# Patient Record
Sex: FEMALE | Race: OTHER | HISPANIC OR LATINO
[De-identification: names, ages, dates, MRNs, and addresses within clinical notes are randomized per-mention and may not be internally consistent; named-entity substitution may affect disease eponyms.]

---

## 2017-03-30 ENCOUNTER — APPOINTMENT (OUTPATIENT)
Dept: OTOLARYNGOLOGY | Facility: CLINIC | Age: 32
End: 2017-03-30

## 2017-03-30 VITALS — OXYGEN SATURATION: 99 % | SYSTOLIC BLOOD PRESSURE: 112 MMHG | HEART RATE: 71 BPM | DIASTOLIC BLOOD PRESSURE: 79 MMHG

## 2017-03-30 VITALS — HEIGHT: 61 IN | WEIGHT: 138 LBS | BODY MASS INDEX: 26.06 KG/M2

## 2017-03-30 DIAGNOSIS — Z83.49 FAMILY HISTORY OF OTHER ENDOCRINE, NUTRITIONAL AND METABOLIC DISEASES: ICD-10-CM

## 2017-03-30 DIAGNOSIS — J04.0 ACUTE LARYNGITIS: ICD-10-CM

## 2017-03-30 DIAGNOSIS — Z83.3 FAMILY HISTORY OF DIABETES MELLITUS: ICD-10-CM

## 2017-03-30 DIAGNOSIS — Z78.9 OTHER SPECIFIED HEALTH STATUS: ICD-10-CM

## 2017-03-30 DIAGNOSIS — Z80.9 FAMILY HISTORY OF MALIGNANT NEOPLASM, UNSPECIFIED: ICD-10-CM

## 2017-03-30 DIAGNOSIS — E83.52 HYPERCALCEMIA: ICD-10-CM

## 2017-03-30 DIAGNOSIS — R68.89 OTHER GENERAL SYMPTOMS AND SIGNS: ICD-10-CM

## 2017-03-30 DIAGNOSIS — K21.9 ACUTE LARYNGITIS: ICD-10-CM

## 2017-03-30 DIAGNOSIS — D11.9 BENIGN NEOPLASM OF MAJOR SALIVARY GLAND, UNSPECIFIED: ICD-10-CM

## 2017-03-30 DIAGNOSIS — R22.1 LOCALIZED SWELLING, MASS AND LUMP, NECK: ICD-10-CM

## 2017-03-30 DIAGNOSIS — E06.3 AUTOIMMUNE THYROIDITIS: ICD-10-CM

## 2017-03-30 DIAGNOSIS — Z86.39 PERSONAL HISTORY OF OTHER ENDOCRINE, NUTRITIONAL AND METABOLIC DISEASE: ICD-10-CM

## 2017-03-30 DIAGNOSIS — Z82.49 FAMILY HISTORY OF ISCHEMIC HEART DISEASE AND OTHER DISEASES OF THE CIRCULATORY SYSTEM: ICD-10-CM

## 2017-03-30 RX ORDER — UBIDECARENONE/VIT E ACET 100MG-5
1000 CAPSULE ORAL
Refills: 0 | Status: ACTIVE | COMMUNITY

## 2017-03-31 ENCOUNTER — TRANSCRIPTION ENCOUNTER (OUTPATIENT)
Age: 32
End: 2017-03-31

## 2017-04-11 ENCOUNTER — RESULT REVIEW (OUTPATIENT)
Age: 32
End: 2017-04-11

## 2017-04-11 VITALS
WEIGHT: 132.94 LBS | SYSTOLIC BLOOD PRESSURE: 102 MMHG | HEART RATE: 79 BPM | DIASTOLIC BLOOD PRESSURE: 62 MMHG | RESPIRATION RATE: 15 BRPM | TEMPERATURE: 98 F | OXYGEN SATURATION: 99 % | HEIGHT: 61 IN

## 2017-04-11 NOTE — ASU PATIENT PROFILE, ADULT - PMH
Facial paralysis/[place] palsy    Hyperparathyroidism    Severe preeclampsia  2002  Vitamin D deficiency

## 2017-04-11 NOTE — ASU PATIENT PROFILE, ADULT - TEACHING/LEARNING LEARNING PREFERENCES
verbal instruction/written material verbal instruction/written material/individual instruction/audio

## 2017-04-11 NOTE — ASU PREOP CHECKLIST - SELECT TESTS ORDERED
CBC/EKG/Urinalysis/PT/PTT/CXR/u/a c&s/HCG/CMP u/a c&s, ICON_NEGATIVE/PT/PTT/CXR/CMP/Urinalysis/HCG/CBC/EKG

## 2017-04-12 ENCOUNTER — APPOINTMENT (OUTPATIENT)
Dept: OTOLARYNGOLOGY | Facility: HOSPITAL | Age: 32
End: 2017-04-12

## 2017-04-12 ENCOUNTER — OUTPATIENT (OUTPATIENT)
Dept: OUTPATIENT SERVICES | Facility: HOSPITAL | Age: 32
LOS: 1 days | Discharge: HOME | End: 2017-04-12
Payer: COMMERCIAL

## 2017-04-12 VITALS
RESPIRATION RATE: 20 BRPM | HEART RATE: 90 BPM | OXYGEN SATURATION: 99 % | DIASTOLIC BLOOD PRESSURE: 64 MMHG | SYSTOLIC BLOOD PRESSURE: 109 MMHG

## 2017-04-12 DIAGNOSIS — Z98.891 HISTORY OF UTERINE SCAR FROM PREVIOUS SURGERY: Chronic | ICD-10-CM

## 2017-04-12 LAB
ALBUMIN SERPL ELPH-MCNC: 3.7 G/DL — SIGNIFICANT CHANGE UP (ref 3.4–5)
ANION GAP SERPL CALC-SCNC: 7 MMOL/L — LOW (ref 9–16)
BUN SERPL-MCNC: 14 MG/DL — SIGNIFICANT CHANGE UP (ref 7–23)
CALCIUM SERPL-MCNC: 9 MG/DL — SIGNIFICANT CHANGE UP (ref 8.5–10.5)
CHLORIDE SERPL-SCNC: 107 MMOL/L — SIGNIFICANT CHANGE UP (ref 96–108)
CO2 SERPL-SCNC: 24 MMOL/L — SIGNIFICANT CHANGE UP (ref 22–31)
CREAT SERPL-MCNC: 0.51 MG/DL — SIGNIFICANT CHANGE UP (ref 0.5–1.3)
GLUCOSE SERPL-MCNC: 136 MG/DL — HIGH (ref 70–99)
POTASSIUM SERPL-MCNC: 4.1 MMOL/L — SIGNIFICANT CHANGE UP (ref 3.5–5.3)
POTASSIUM SERPL-SCNC: 4.1 MMOL/L — SIGNIFICANT CHANGE UP (ref 3.5–5.3)
PTH-INTACT IO % DIF SERPL: 17.7 PG/ML — SIGNIFICANT CHANGE UP (ref 8.5–72.5)
PTH-INTACT IO % DIF SERPL: 21.3 PG/ML — SIGNIFICANT CHANGE UP (ref 8.5–72.5)
PTH-INTACT IO % DIF SERPL: 7.5 PG/ML — LOW (ref 8.5–72.5)
PTH-INTACT IO % DIF SERPL: 96.4 PG/ML — HIGH (ref 8.5–72.5)
SODIUM SERPL-SCNC: 138 MMOL/L — SIGNIFICANT CHANGE UP (ref 135–145)

## 2017-04-12 PROCEDURE — 88305 TISSUE EXAM BY PATHOLOGIST: CPT

## 2017-04-12 PROCEDURE — 42440 EXCISE SUBMAXILLARY GLAND: CPT | Mod: LT

## 2017-04-12 PROCEDURE — 60500 EXPLORE PARATHYROID GLANDS: CPT

## 2017-04-12 PROCEDURE — 82040 ASSAY OF SERUM ALBUMIN: CPT

## 2017-04-12 PROCEDURE — 83970 ASSAY OF PARATHORMONE: CPT

## 2017-04-12 PROCEDURE — 38510 BIOPSY/REMOVAL LYMPH NODES: CPT

## 2017-04-12 PROCEDURE — 60520 REMOVAL OF THYMUS GLAND: CPT | Mod: 59

## 2017-04-12 PROCEDURE — 88331 PATH CONSLTJ SURG 1 BLK 1SPC: CPT

## 2017-04-12 PROCEDURE — 36415 COLL VENOUS BLD VENIPUNCTURE: CPT

## 2017-04-12 PROCEDURE — 88309 TISSUE EXAM BY PATHOLOGIST: CPT

## 2017-04-12 PROCEDURE — 60500 EXPLORE PARATHYROID GLANDS: CPT | Mod: RT

## 2017-04-12 PROCEDURE — 86901 BLOOD TYPING SEROLOGIC RH(D): CPT

## 2017-04-12 PROCEDURE — 86850 RBC ANTIBODY SCREEN: CPT

## 2017-04-12 PROCEDURE — 88311 DECALCIFY TISSUE: CPT

## 2017-04-12 PROCEDURE — 80048 BASIC METABOLIC PNL TOTAL CA: CPT

## 2017-04-12 PROCEDURE — 86900 BLOOD TYPING SEROLOGIC ABO: CPT

## 2017-04-12 RX ORDER — ONDANSETRON 8 MG/1
4 TABLET, FILM COATED ORAL EVERY 6 HOURS
Qty: 0 | Refills: 0 | Status: DISCONTINUED | OUTPATIENT
Start: 2017-04-12 | End: 2017-04-12

## 2017-04-12 RX ORDER — KETOROLAC TROMETHAMINE 30 MG/ML
30 SYRINGE (ML) INJECTION ONCE
Qty: 0 | Refills: 0 | Status: DISCONTINUED | OUTPATIENT
Start: 2017-04-12 | End: 2017-04-12

## 2017-04-12 RX ORDER — TRAMADOL HYDROCHLORIDE 50 MG/1
50 TABLET ORAL EVERY 4 HOURS
Qty: 0 | Refills: 0 | Status: DISCONTINUED | OUTPATIENT
Start: 2017-04-12 | End: 2017-04-12

## 2017-04-12 RX ORDER — SODIUM CHLORIDE 9 MG/ML
1000 INJECTION, SOLUTION INTRAVENOUS
Qty: 0 | Refills: 0 | Status: DISCONTINUED | OUTPATIENT
Start: 2017-04-12 | End: 2017-04-12

## 2017-04-12 RX ADMIN — TRAMADOL HYDROCHLORIDE 50 MILLIGRAM(S): 50 TABLET ORAL at 18:20

## 2017-04-12 RX ADMIN — Medication 30 MILLIGRAM(S): at 14:00

## 2017-04-12 RX ADMIN — Medication 30 MILLIGRAM(S): at 13:23

## 2017-04-12 RX ADMIN — ONDANSETRON 4 MILLIGRAM(S): 8 TABLET, FILM COATED ORAL at 14:41

## 2017-04-12 RX ADMIN — TRAMADOL HYDROCHLORIDE 50 MILLIGRAM(S): 50 TABLET ORAL at 18:50

## 2017-04-12 NOTE — BRIEF OPERATIVE NOTE - PROCEDURE
Submandibular gland removal  04/12/2017    Active  [email or web address]  Parathyroidectomy with nerve integrity monitoring  04/12/2017    Active  [email or web address]

## 2017-04-12 NOTE — BRIEF OPERATIVE NOTE - PRE-OP DX
Hyperparathyroidism  04/12/2017    Active  Ruthann Abbott  Submandibular gland mass  04/12/2017    Active  Ruthann Abbott

## 2017-04-13 LAB — SURGICAL PATHOLOGY STUDY: SIGNIFICANT CHANGE UP

## 2017-04-17 ENCOUNTER — LABORATORY RESULT (OUTPATIENT)
Age: 32
End: 2017-04-17

## 2017-04-17 ENCOUNTER — APPOINTMENT (OUTPATIENT)
Dept: OTOLARYNGOLOGY | Facility: CLINIC | Age: 32
End: 2017-04-17

## 2017-04-17 VITALS
HEART RATE: 60 BPM | DIASTOLIC BLOOD PRESSURE: 82 MMHG | SYSTOLIC BLOOD PRESSURE: 125 MMHG | TEMPERATURE: 98.5 F | OXYGEN SATURATION: 100 %

## 2017-04-17 DIAGNOSIS — D11.7 BENIGN NEOPLASM OF OTHER MAJOR SALIVARY GLANDS: ICD-10-CM

## 2017-04-17 DIAGNOSIS — E06.3 AUTOIMMUNE THYROIDITIS: ICD-10-CM

## 2017-04-17 DIAGNOSIS — E21.0 PRIMARY HYPERPARATHYROIDISM: ICD-10-CM

## 2017-04-17 DIAGNOSIS — Z88.5 ALLERGY STATUS TO NARCOTIC AGENT: ICD-10-CM

## 2017-04-17 DIAGNOSIS — Z91.040 LATEX ALLERGY STATUS: ICD-10-CM

## 2017-04-17 DIAGNOSIS — D35.1 BENIGN NEOPLASM OF PARATHYROID GLAND: ICD-10-CM

## 2017-04-17 DIAGNOSIS — R59.0 LOCALIZED ENLARGED LYMPH NODES: ICD-10-CM

## 2017-04-17 PROBLEM — E55.9 VITAMIN D DEFICIENCY, UNSPECIFIED: Chronic | Status: ACTIVE | Noted: 2017-04-11

## 2017-04-17 PROBLEM — G51.0 BELL'S PALSY: Chronic | Status: ACTIVE | Noted: 2017-04-11

## 2017-04-17 PROBLEM — E21.3 HYPERPARATHYROIDISM, UNSPECIFIED: Chronic | Status: ACTIVE | Noted: 2017-04-11

## 2017-04-17 PROBLEM — O14.10 SEVERE PRE-ECLAMPSIA, UNSPECIFIED TRIMESTER: Chronic | Status: ACTIVE | Noted: 2017-04-11

## 2017-05-04 ENCOUNTER — APPOINTMENT (OUTPATIENT)
Dept: OTOLARYNGOLOGY | Facility: CLINIC | Age: 32
End: 2017-05-04

## 2017-05-04 VITALS — SYSTOLIC BLOOD PRESSURE: 131 MMHG | OXYGEN SATURATION: 99 % | DIASTOLIC BLOOD PRESSURE: 73 MMHG | HEART RATE: 83 BPM

## 2017-05-04 DIAGNOSIS — D11.9 BENIGN NEOPLASM OF MAJOR SALIVARY GLAND, UNSPECIFIED: ICD-10-CM

## 2017-05-04 DIAGNOSIS — D35.1 BENIGN NEOPLASM OF PARATHYROID GLAND: ICD-10-CM

## 2017-05-04 DIAGNOSIS — R04.0 EPISTAXIS: ICD-10-CM

## 2017-05-04 RX ORDER — AZITHROMYCIN DIHYDRATE 500 MG/1
500 TABLET, FILM COATED ORAL DAILY
Qty: 1 | Refills: 0 | Status: COMPLETED | COMMUNITY
Start: 2017-04-11 | End: 2017-05-04

## 2017-05-30 ENCOUNTER — RESULT REVIEW (OUTPATIENT)
Age: 32
End: 2017-05-30

## 2019-03-25 ENCOUNTER — TRANSCRIPTION ENCOUNTER (OUTPATIENT)
Age: 34
End: 2019-03-25

## 2019-03-25 ENCOUNTER — APPOINTMENT (OUTPATIENT)
Dept: INTERNAL MEDICINE | Facility: CLINIC | Age: 34
End: 2019-03-25
Payer: COMMERCIAL

## 2019-03-25 ENCOUNTER — LABORATORY RESULT (OUTPATIENT)
Age: 34
End: 2019-03-25

## 2019-03-25 VITALS
BODY MASS INDEX: 25.03 KG/M2 | SYSTOLIC BLOOD PRESSURE: 124 MMHG | DIASTOLIC BLOOD PRESSURE: 70 MMHG | HEIGHT: 62 IN | WEIGHT: 136 LBS

## 2019-03-25 LAB
HCG UR QL: NEGATIVE
QUALITY CONTROL: YES

## 2019-03-25 PROCEDURE — 93000 ELECTROCARDIOGRAM COMPLETE: CPT

## 2019-03-25 PROCEDURE — 86580 TB INTRADERMAL TEST: CPT

## 2019-03-25 PROCEDURE — 81025 URINE PREGNANCY TEST: CPT

## 2019-03-25 PROCEDURE — 36415 COLL VENOUS BLD VENIPUNCTURE: CPT

## 2019-03-25 PROCEDURE — 99395 PREV VISIT EST AGE 18-39: CPT | Mod: 25

## 2019-03-25 RX ORDER — FLUTICASONE PROPIONATE 50 MCG
50 SPRAY, SUSPENSION NASAL
Refills: 0 | Status: DISCONTINUED | COMMUNITY
End: 2019-03-25

## 2019-03-26 ENCOUNTER — NON-APPOINTMENT (OUTPATIENT)
Age: 34
End: 2019-03-26

## 2019-03-26 LAB
ALBUMIN SERPL ELPH-MCNC: 4.3 G/DL
ALP BLD-CCNC: 76 U/L
ALT SERPL-CCNC: 21 U/L
ANION GAP SERPL CALC-SCNC: 13 MMOL/L
APPEARANCE: ABNORMAL
AST SERPL-CCNC: 20 U/L
BASOPHILS # BLD AUTO: 0.04 K/UL
BASOPHILS NFR BLD AUTO: 0.9 %
BILIRUB SERPL-MCNC: 0.2 MG/DL
BILIRUBIN URINE: NEGATIVE
BLOOD URINE: NEGATIVE
BUN SERPL-MCNC: 10 MG/DL
CALCIUM SERPL-MCNC: 9.2 MG/DL
CHLORIDE SERPL-SCNC: 103 MMOL/L
CHOLEST SERPL-MCNC: 171 MG/DL
CHOLEST/HDLC SERPL: 3.2 RATIO
CO2 SERPL-SCNC: 22 MMOL/L
COLOR: YELLOW
CREAT SERPL-MCNC: 0.67 MG/DL
EOSINOPHIL # BLD AUTO: 0.09 K/UL
EOSINOPHIL NFR BLD AUTO: 2.1 %
FOLATE SERPL-MCNC: 13.5 NG/ML
GLUCOSE QUALITATIVE U: NEGATIVE
GLUCOSE SERPL-MCNC: 101 MG/DL
HCG SERPL QL: NEGATIVE
HCT VFR BLD CALC: 42.3 %
HDLC SERPL-MCNC: 53 MG/DL
HGB BLD-MCNC: 13.7 G/DL
IMM GRANULOCYTES NFR BLD AUTO: 0.7 %
KETONES URINE: NEGATIVE
LDLC SERPL CALC-MCNC: 102 MG/DL
LEUKOCYTE ESTERASE URINE: NEGATIVE
LYMPHOCYTES # BLD AUTO: 0.99 K/UL
LYMPHOCYTES NFR BLD AUTO: 23.5 %
MAN DIFF?: NORMAL
MCHC RBC-ENTMCNC: 28.4 PG
MCHC RBC-ENTMCNC: 32.4 GM/DL
MCV RBC AUTO: 87.8 FL
MONOCYTES # BLD AUTO: 0.34 K/UL
MONOCYTES NFR BLD AUTO: 8.1 %
NEUTROPHILS # BLD AUTO: 2.73 K/UL
NEUTROPHILS NFR BLD AUTO: 64.7 %
NITRITE URINE: NEGATIVE
PAPP-A SERPL-ACNC: <1 MIU/ML
PH URINE: 5.5
PLATELET # BLD AUTO: 177 K/UL
POTASSIUM SERPL-SCNC: 4 MMOL/L
PROT SERPL-MCNC: 7.1 G/DL
PROTEIN URINE: NEGATIVE
RBC # BLD: 4.82 M/UL
RBC # FLD: 13.2 %
SODIUM SERPL-SCNC: 138 MMOL/L
SPECIFIC GRAVITY URINE: 1.03
T4 FREE SERPL-MCNC: 1 NG/DL
TRIGL SERPL-MCNC: 82 MG/DL
TSH SERPL-ACNC: 2.28 UIU/ML
UROBILINOGEN URINE: NORMAL
VIT B12 SERPL-MCNC: 664 PG/ML
WBC # FLD AUTO: 4.22 K/UL

## 2019-03-26 NOTE — COUNSELING
[Activity counseling provided] : activity [Behavioral health counseling provided] : behavioral health  [Engage in a relaxing activity] : Engage in a relaxing activity

## 2019-03-26 NOTE — PLAN
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 Only concern is very small possibility of pregnancy, in office test neg, b/w sent. Will f/u in office in 2 days for PPD reading, lab results and school forms

## 2019-03-26 NOTE — HEALTH RISK ASSESSMENT
[Very Good] : ~his/her~  mood as very good [No falls in past year] : Patient reported no falls in the past year [0] : 2) Feeling down, depressed, or hopeless: Not at all (0) [Patient reported PAP Smear was normal] : Patient reported PAP Smear was normal [HIV test declined] : HIV test declined [Hepatitis C test declined] : Hepatitis C test declined [With Family] : lives with family [Feels Safe at Home] : Feels safe at home [] : No [de-identified] : socially [de-identified] : mostly healthy [MammogramDate] : N/A [PapSmearDate] : 03/2018 [PapSmearComments] : DR. BROTHERS [BoneDensityDate] : N/A [ColonoscopyDate] : N/A

## 2019-03-26 NOTE — HISTORY OF PRESENT ILLNESS
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

## 2019-03-26 NOTE — PHYSICAL EXAM
[Normal Female:] : bladder was normal on palpation [Normal] : normal gait, coordination grossly intact, no focal deficits [de-identified] : denies lumps pain discharge [FreeTextEntry1] : GYN [de-identified] : no lymphadenopathy [de-identified] : denies excessive thirst, urination [de-identified] : acne, no other rash/lesions [de-identified] : A & O X3, appropriate mood & affect

## 2019-03-26 NOTE — REVIEW OF SYSTEMS
[Negative] : Heme/Lymph [FreeTextEntry1] : period a few days late, concerned about pregnancy, uses protection, has appt scheduled with GYN to discuss BC

## 2019-03-27 LAB — 25(OH)D3 SERPL-MCNC: 35.7 NG/ML

## 2019-04-01 ENCOUNTER — APPOINTMENT (OUTPATIENT)
Dept: INTERNAL MEDICINE | Facility: CLINIC | Age: 34
End: 2019-04-01
Payer: COMMERCIAL

## 2019-04-01 DIAGNOSIS — Z11.1 ENCOUNTER FOR SCREENING FOR RESPIRATORY TUBERCULOSIS: ICD-10-CM

## 2019-04-01 DIAGNOSIS — Z23 ENCOUNTER FOR IMMUNIZATION: ICD-10-CM

## 2019-04-01 PROCEDURE — G0008: CPT

## 2019-04-01 PROCEDURE — 90686 IIV4 VACC NO PRSV 0.5 ML IM: CPT

## 2019-04-17 ENCOUNTER — APPOINTMENT (OUTPATIENT)
Dept: INTERNAL MEDICINE | Facility: CLINIC | Age: 34
End: 2019-04-17

## 2019-05-14 ENCOUNTER — APPOINTMENT (OUTPATIENT)
Dept: OBGYN | Facility: CLINIC | Age: 34
End: 2019-05-14

## 2019-09-10 ENCOUNTER — APPOINTMENT (OUTPATIENT)
Dept: OBGYN | Facility: CLINIC | Age: 34
End: 2019-09-10

## 2020-08-28 ENCOUNTER — APPOINTMENT (OUTPATIENT)
Dept: ENDOCRINOLOGY | Facility: CLINIC | Age: 35
End: 2020-08-28
Payer: COMMERCIAL

## 2020-08-28 VITALS
SYSTOLIC BLOOD PRESSURE: 116 MMHG | WEIGHT: 136 LBS | DIASTOLIC BLOOD PRESSURE: 68 MMHG | HEIGHT: 62 IN | OXYGEN SATURATION: 99 % | HEART RATE: 71 BPM | BODY MASS INDEX: 25.03 KG/M2

## 2020-08-28 DIAGNOSIS — E06.3 AUTOIMMUNE THYROIDITIS: ICD-10-CM

## 2020-08-28 LAB
24R-OH-CALCIDIOL SERPL-MCNC: 54.7 PG/ML
25(OH)D3 SERPL-MCNC: 30 NG/ML
ALBUMIN SERPL ELPH-MCNC: 4.6 G/DL
ALP BLD-CCNC: 72 U/L
ALT SERPL-CCNC: 17 U/L
ANION GAP SERPL CALC-SCNC: 12 MMOL/L
AST SERPL-CCNC: 18 U/L
BILIRUB SERPL-MCNC: <0.2 MG/DL
BUN SERPL-MCNC: 10 MG/DL
CALCIUM SERPL-MCNC: 9.6 MG/DL
CALCIUM SERPL-MCNC: 9.6 MG/DL
CHLORIDE SERPL-SCNC: 103 MMOL/L
CO2 SERPL-SCNC: 23 MMOL/L
CREAT SERPL-MCNC: 0.67 MG/DL
GLUCOSE SERPL-MCNC: 100 MG/DL
PARATHYROID HORMONE INTACT: 24 PG/ML
POTASSIUM SERPL-SCNC: 4.1 MMOL/L
PROT SERPL-MCNC: 6.9 G/DL
SODIUM SERPL-SCNC: 138 MMOL/L
T4 FREE SERPL-MCNC: 1.1 NG/DL
TSH SERPL-ACNC: 1.71 UIU/ML

## 2020-08-28 PROCEDURE — 99214 OFFICE O/P EST MOD 30 MIN: CPT

## 2020-09-03 NOTE — HISTORY OF PRESENT ILLNESS
[FreeTextEntry1] : Ms. DILLAN DANIEL is a 35 year old female\par coming for f/u primary hyperparathyroidism s/p parathyroid surgery 2017 with Dr Morillo\par she came back today as her mood swings and fatigue came back and she felt the same at the time her hyperparathyroidism was going on

## 2020-10-05 ENCOUNTER — APPOINTMENT (OUTPATIENT)
Dept: INTERNAL MEDICINE | Facility: CLINIC | Age: 35
End: 2020-10-05

## 2020-10-28 ENCOUNTER — APPOINTMENT (OUTPATIENT)
Dept: INTERNAL MEDICINE | Facility: CLINIC | Age: 35
End: 2020-10-28
Payer: COMMERCIAL

## 2020-10-28 VITALS
BODY MASS INDEX: 24.66 KG/M2 | WEIGHT: 134 LBS | DIASTOLIC BLOOD PRESSURE: 60 MMHG | SYSTOLIC BLOOD PRESSURE: 116 MMHG | HEIGHT: 62 IN

## 2020-10-28 PROCEDURE — 99395 PREV VISIT EST AGE 18-39: CPT | Mod: 25

## 2020-10-28 PROCEDURE — 99072 ADDL SUPL MATRL&STAF TM PHE: CPT

## 2020-10-28 PROCEDURE — 36415 COLL VENOUS BLD VENIPUNCTURE: CPT

## 2020-10-28 NOTE — PLAN
[FreeTextEntry1] : 35-year-old female is noted presents for annual exam, feels well, denies chest pain shortness of breath palpitations dizziness.\par Stressed the importance of regular aerobic exercise\par Up to date with screenings, has received flu vaccine \par Call next week to review labs\par \par

## 2020-10-28 NOTE — HEALTH RISK ASSESSMENT
[Very Good] : ~his/her~  mood as very good [Yes] : Yes [Monthly or less (1 pt)] : Monthly or less (1 point) [1 or 2 (0 pts)] : 1 or 2 (0 points) [Never (0 pts)] : Never (0 points) [No falls in past year] : Patient reported no falls in the past year [0] : 2) Feeling down, depressed, or hopeless: Not at all (0) [Patient declined mammogram] : Patient declined mammogram [Patient reported PAP Smear was normal] : Patient reported PAP Smear was normal [Patient declined bone density test] : Patient declined bone density test [Patient declined colonoscopy] : Patient declined colonoscopy [HIV test declined] : HIV test declined [Hepatitis C test declined] : Hepatitis C test declined [] : No [[id number]] : 0 [PapSmearDate] : 02/19

## 2020-10-28 NOTE — HISTORY OF PRESENT ILLNESS
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 Feels great, very dizzy but not exercising regularly, encouraged to do so. Denies chest pain shortness of breath palpitations dizziness\par \par Up-to-date with endocrinology, no problems since having parathyroidectomy

## 2020-10-28 NOTE — PHYSICAL EXAM
[Normal Female:] : bladder was normal on palpation [Normal] : normal gait, coordination grossly intact, no focal deficits [de-identified] : Deferred GYN; Denies pain, lumps and discharge [FreeTextEntry1] : Deferred GI/GYN [de-identified] : No lymphadenopathy [de-identified] : Denies excessive thirst, urination, fatigue [de-identified] : No rash or skin lesion [de-identified] : Alert and Oriented x3.  Appropriate mood and affect

## 2020-10-29 LAB
ALBUMIN SERPL ELPH-MCNC: 4.5 G/DL
ALP BLD-CCNC: 82 U/L
ALT SERPL-CCNC: 16 U/L
ANION GAP SERPL CALC-SCNC: 11 MMOL/L
APPEARANCE: CLEAR
AST SERPL-CCNC: 16 U/L
BASOPHILS # BLD AUTO: 0.06 K/UL
BASOPHILS NFR BLD AUTO: 1.3 %
BILIRUB SERPL-MCNC: <0.2 MG/DL
BILIRUBIN URINE: NEGATIVE
BLOOD URINE: NEGATIVE
BUN SERPL-MCNC: 7 MG/DL
CALCIUM SERPL-MCNC: 9.3 MG/DL
CHLORIDE SERPL-SCNC: 102 MMOL/L
CHOLEST SERPL-MCNC: 189 MG/DL
CO2 SERPL-SCNC: 26 MMOL/L
COLOR: NORMAL
CREAT SERPL-MCNC: 0.66 MG/DL
EOSINOPHIL # BLD AUTO: 0.11 K/UL
EOSINOPHIL NFR BLD AUTO: 2.4 %
FOLATE SERPL-MCNC: 16 NG/ML
GLUCOSE QUALITATIVE U: NEGATIVE
GLUCOSE SERPL-MCNC: 83 MG/DL
HCT VFR BLD CALC: 41.7 %
HDLC SERPL-MCNC: 54 MG/DL
HGB BLD-MCNC: 13.3 G/DL
IMM GRANULOCYTES NFR BLD AUTO: 0.4 %
KETONES URINE: NEGATIVE
LDLC SERPL CALC-MCNC: 110 MG/DL
LEUKOCYTE ESTERASE URINE: NEGATIVE
LYMPHOCYTES # BLD AUTO: 1.48 K/UL
LYMPHOCYTES NFR BLD AUTO: 32.4 %
MAN DIFF?: NORMAL
MCHC RBC-ENTMCNC: 28.2 PG
MCHC RBC-ENTMCNC: 31.9 GM/DL
MCV RBC AUTO: 88.3 FL
MONOCYTES # BLD AUTO: 0.33 K/UL
MONOCYTES NFR BLD AUTO: 7.2 %
NEUTROPHILS # BLD AUTO: 2.57 K/UL
NEUTROPHILS NFR BLD AUTO: 56.3 %
NITRITE URINE: NEGATIVE
NONHDLC SERPL-MCNC: 135 MG/DL
PH URINE: 6
PLATELET # BLD AUTO: 166 K/UL
POTASSIUM SERPL-SCNC: 4.3 MMOL/L
PROT SERPL-MCNC: 6.8 G/DL
PROTEIN URINE: NEGATIVE
RBC # BLD: 4.72 M/UL
RBC # FLD: 12.4 %
SODIUM SERPL-SCNC: 138 MMOL/L
SPECIFIC GRAVITY URINE: 1.01
T4 FREE SERPL-MCNC: 1.1 NG/DL
TRIGL SERPL-MCNC: 122 MG/DL
TSH SERPL-ACNC: 2.16 UIU/ML
UROBILINOGEN URINE: NORMAL
VIT B12 SERPL-MCNC: 1080 PG/ML
WBC # FLD AUTO: 4.57 K/UL

## 2020-11-10 ENCOUNTER — APPOINTMENT (OUTPATIENT)
Dept: OBGYN | Facility: CLINIC | Age: 35
End: 2020-11-10
Payer: COMMERCIAL

## 2020-11-10 VITALS
SYSTOLIC BLOOD PRESSURE: 120 MMHG | TEMPERATURE: 97.5 F | BODY MASS INDEX: 25.4 KG/M2 | WEIGHT: 138 LBS | DIASTOLIC BLOOD PRESSURE: 70 MMHG | HEIGHT: 62 IN

## 2020-11-10 PROCEDURE — 99072 ADDL SUPL MATRL&STAF TM PHE: CPT

## 2020-11-10 PROCEDURE — 99395 PREV VISIT EST AGE 18-39: CPT

## 2020-11-11 NOTE — HISTORY OF PRESENT ILLNESS
[TextBox_4] : 36yo P1 here for annual exam/ last was 3.5years ago. Reg periods.  No complaints.  H/o parathyroidectomy.\par        Not on any contraception currrently/ withdrawal.  \par        ; one child- 17yrs old.  She is in a relationship.  Currently studying to complete her RN/ will be done 5/2021.  Currently working in an urgent care.

## 2020-11-13 LAB
CYTOLOGY CVX/VAG DOC THIN PREP: NORMAL
HPV HIGH+LOW RISK DNA PNL CVX: NOT DETECTED

## 2020-12-21 PROBLEM — Z11.1 ENCOUNTER FOR PPD TEST: Status: RESOLVED | Noted: 2019-03-25 | Resolved: 2020-12-21

## 2021-11-01 ENCOUNTER — APPOINTMENT (OUTPATIENT)
Dept: RHEUMATOLOGY | Facility: CLINIC | Age: 36
End: 2021-11-01
Payer: MEDICAID

## 2021-11-01 VITALS
DIASTOLIC BLOOD PRESSURE: 87 MMHG | HEART RATE: 77 BPM | SYSTOLIC BLOOD PRESSURE: 126 MMHG | HEIGHT: 62 IN | WEIGHT: 146 LBS | BODY MASS INDEX: 26.87 KG/M2 | OXYGEN SATURATION: 99 %

## 2021-11-01 DIAGNOSIS — M35.00 SICCA SYNDROME, UNSPECIFIED: ICD-10-CM

## 2021-11-01 DIAGNOSIS — G89.29 PAIN IN UNSPECIFIED JOINT: ICD-10-CM

## 2021-11-01 DIAGNOSIS — M25.50 PAIN IN UNSPECIFIED JOINT: ICD-10-CM

## 2021-11-01 PROCEDURE — 99203 OFFICE O/P NEW LOW 30 MIN: CPT | Mod: 25

## 2021-11-01 PROCEDURE — 99072 ADDL SUPL MATRL&STAF TM PHE: CPT

## 2021-11-02 LAB
BASOPHILS # BLD AUTO: 0.07 K/UL
BASOPHILS NFR BLD AUTO: 1.5 %
EOSINOPHIL # BLD AUTO: 0.09 K/UL
EOSINOPHIL NFR BLD AUTO: 1.9 %
ERYTHROCYTE [SEDIMENTATION RATE] IN BLOOD BY WESTERGREN METHOD: 13 MM/HR
HCT VFR BLD CALC: 39.6 %
HGB BLD-MCNC: 13.2 G/DL
IMM GRANULOCYTES NFR BLD AUTO: 0.4 %
LYMPHOCYTES # BLD AUTO: 1.52 K/UL
LYMPHOCYTES NFR BLD AUTO: 32 %
MAN DIFF?: NORMAL
MCHC RBC-ENTMCNC: 29.1 PG
MCHC RBC-ENTMCNC: 33.3 GM/DL
MCV RBC AUTO: 87.4 FL
MONOCYTES # BLD AUTO: 0.42 K/UL
MONOCYTES NFR BLD AUTO: 8.8 %
NEUTROPHILS # BLD AUTO: 2.63 K/UL
NEUTROPHILS NFR BLD AUTO: 55.4 %
PLATELET # BLD AUTO: 197 K/UL
RBC # BLD: 4.53 M/UL
RBC # FLD: 12.3 %
WBC # FLD AUTO: 4.75 K/UL

## 2021-11-11 NOTE — HISTORY OF PRESENT ILLNESS
[FreeTextEntry1] : 37 yo female here for evaluation of joint pain.\par She is c/o pain in her hands, hips, and knees.\par Right wrist and hands swell up rarely.\par Symptoms are worse in the cold weather.\par Since she started to work out again, she is no longer stiff.\par She does deep tissue massage and applies Vicks rub.  Rarely, she takes NSAIDs, which help.\par Her mother has HLA-B27 positive seronegative spondyloarthropathy, so she was concerned.\par \par She was getting pimples and GI symptoms so she discontinued milk products, and these symptoms resolved.\par \par She had a rash on her antecubital fossa, which resolved after applying Cerave lotion.  Urgent care told her it was eczema.\par No dry eyes or mouth.\par No oral ulcers\par No Raynauds.\par \par

## 2021-11-14 ENCOUNTER — NON-APPOINTMENT (OUTPATIENT)
Age: 36
End: 2021-11-14

## 2021-11-17 ENCOUNTER — APPOINTMENT (OUTPATIENT)
Dept: OBGYN | Facility: CLINIC | Age: 36
End: 2021-11-17
Payer: MEDICAID

## 2021-11-17 VITALS
SYSTOLIC BLOOD PRESSURE: 120 MMHG | BODY MASS INDEX: 25.95 KG/M2 | DIASTOLIC BLOOD PRESSURE: 70 MMHG | HEIGHT: 62 IN | WEIGHT: 141 LBS

## 2021-11-17 DIAGNOSIS — Z01.419 ENCOUNTER FOR GYNECOLOGICAL EXAMINATION (GENERAL) (ROUTINE) W/OUT ABNORMAL FINDINGS: ICD-10-CM

## 2021-11-17 PROCEDURE — 99395 PREV VISIT EST AGE 18-39: CPT

## 2021-11-17 PROCEDURE — 99072 ADDL SUPL MATRL&STAF TM PHE: CPT

## 2021-11-17 NOTE — REVIEW OF SYSTEMS
not examined
[Negative] : Heme/Lymph
not examined
not examined
detailed exam
not examined

## 2021-11-17 NOTE — HISTORY OF PRESENT ILLNESS
[TextBox_4] : 35yo P1 here for annual exam/ last was 3.5years ago. Reg periods. No complaints. H/o parathyroidectomy.\par Not on any contraception currrently/ withdrawal. \par \par  ; one child- 18yrs old. She is in a relationship. 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 \par  \par

## 2021-11-17 NOTE — PHYSICAL EXAM

## 2021-11-18 LAB — HPV HIGH+LOW RISK DNA PNL CVX: NOT DETECTED

## 2021-11-22 LAB
25(OH)D3 SERPL-MCNC: 33.4 NG/ML
ALBUMIN SERPL ELPH-MCNC: 4.5 G/DL
ALP BLD-CCNC: 78 U/L
ALT SERPL-CCNC: 13 U/L
ANA PAT FLD IF-IMP: ABNORMAL
ANA SER IF-ACNC: ABNORMAL
ANION GAP SERPL CALC-SCNC: 13 MMOL/L
AST SERPL-CCNC: 18 U/L
BILIRUB SERPL-MCNC: <0.2 MG/DL
BUN SERPL-MCNC: 13 MG/DL
CALCIUM SERPL-MCNC: 9.1 MG/DL
CCP AB SER IA-ACNC: <8 UNITS
CHLORIDE SERPL-SCNC: 101 MMOL/L
CO2 SERPL-SCNC: 23 MMOL/L
CREAT SERPL-MCNC: 0.71 MG/DL
CRP SERPL-MCNC: <3 MG/L
ENA SS-A AB SER IA-ACNC: <0.2 AL
ENA SS-B AB SER IA-ACNC: <0.2 AL
GLUCOSE SERPL-MCNC: 98 MG/DL
HLA-B27 RELATED AG QL: NEGATIVE
POTASSIUM SERPL-SCNC: 4.3 MMOL/L
PROT SERPL-MCNC: 7 G/DL
RF+CCP IGG SER-IMP: NEGATIVE
RHEUMATOID FACT SER QL: <10 IU/ML
SODIUM SERPL-SCNC: 138 MMOL/L

## 2021-11-23 LAB — CYTOLOGY CVX/VAG DOC THIN PREP: NORMAL

## 2021-12-07 ENCOUNTER — NON-APPOINTMENT (OUTPATIENT)
Age: 36
End: 2021-12-07

## 2022-04-11 PROBLEM — J04.0 REFLUX LARYNGITIS: Status: ACTIVE | Noted: 2017-03-30

## 2022-06-28 ENCOUNTER — APPOINTMENT (OUTPATIENT)
Dept: FAMILY MEDICINE | Facility: CLINIC | Age: 37
End: 2022-06-28
Payer: MEDICAID

## 2022-06-28 VITALS
SYSTOLIC BLOOD PRESSURE: 106 MMHG | BODY MASS INDEX: 26.52 KG/M2 | HEART RATE: 89 BPM | WEIGHT: 145 LBS | DIASTOLIC BLOOD PRESSURE: 70 MMHG | TEMPERATURE: 98.1 F | RESPIRATION RATE: 16 BRPM | OXYGEN SATURATION: 98 %

## 2022-06-28 DIAGNOSIS — Z00.00 ENCOUNTER FOR GENERAL ADULT MEDICAL EXAMINATION W/OUT ABNORMAL FINDINGS: ICD-10-CM

## 2022-06-28 PROCEDURE — G0444 DEPRESSION SCREEN ANNUAL: CPT | Mod: 59

## 2022-06-28 PROCEDURE — 99395 PREV VISIT EST AGE 18-39: CPT | Mod: 25

## 2022-06-28 NOTE — HISTORY OF PRESENT ILLNESS
[FreeTextEntry1] : Pt is here for her yearly annual  [de-identified] : 36 year old female with pmh of primary hyperparathydoism s/p parathyroidectomy (follows with Dr. Galindo) presenting for an annual physical exam. Patient is doing well.

## 2022-06-28 NOTE — ASSESSMENT
[FreeTextEntry1] : 36 year old female presented for an annual physical exam. \par routine blood work today, blood collected in the office.\par up to date with screening\par will call back with results.\par

## 2022-06-28 NOTE — HEALTH RISK ASSESSMENT
[Good] : ~his/her~  mood as  good [Never] : Never [Yes] : Yes [Monthly or less (1 pt)] : Monthly or less (1 point) [1 or 2 (0 pts)] : 1 or 2 (0 points) [Never (0 pts)] : Never (0 points) [No] : In the past 12 months have you used drugs other than those required for medical reasons? No [No falls in past year] : Patient reported no falls in the past year [0] : 2) Feeling down, depressed, or hopeless: Not at all (0) [PHQ-2 Negative - No further assessment needed] : PHQ-2 Negative - No further assessment needed [Retinopathy] : Retinopathy [Patient reported PAP Smear was normal] : Patient reported PAP Smear was normal [Patient reported bone density results were abnormal] : Patient reported bone density results were abnormal [HIV Test offered] : HIV Test offered [Hepatitis C test offered] : Hepatitis C test offered [None] : None [Alone] : lives alone [Employed] : employed [] :  [# Of Children ___] : has [unfilled] children [Sexually Active] : sexually active [Feels Safe at Home] : Feels safe at home [Fully functional (bathing, dressing, toileting, transferring, walking, feeding)] : Fully functional (bathing, dressing, toileting, transferring, walking, feeding) [Fully functional (using the telephone, shopping, preparing meals, housekeeping, doing laundry, using] : Fully functional and needs no help or supervision to perform IADLs (using the telephone, shopping, preparing meals, housekeeping, doing laundry, using transportation, managing medications and managing finances) [Smoke Detector] : smoke detector [Carbon Monoxide Detector] : carbon monoxide detector [Safety elements used in home] : safety elements used in home [Seat Belt] :  uses seat belt [Sunscreen] : uses sunscreen [de-identified] : Rheumatology, Endo  [de-identified] : occasionally one of two drinks [Audit-CScore] : 1 [de-identified] : work out 3 times a week  [de-identified] : well balanced diet mixed of fruits, vegetable skips meals [[id number]] : 0 [EyeExamDate] : 07/21 [Change in mental status noted] : No change in mental status noted [Language] : denies difficulty with language [Behavior] : denies difficulty with behavior [Learning/Retaining New Information] : denies difficulty learning/retaining new information [Handling Complex Tasks] : denies difficulty handling complex tasks [Reasoning] : denies difficulty with reasoning [Spatial Ability and Orientation] : denies difficulty with spatial ability and orientation [College] : College [Reports changes in hearing] : Reports no changes in hearing [Reports changes in vision] : Reports no changes in vision [Reports normal functional visual acuity (ie: able to read med bottle)] : Reports poor functional visual acuity.  [Reports changes in dental health] : Reports no changes in dental health [Guns at Home] : no guns at home [TB Exposure] : is not being exposed to tuberculosis [PapSmearDate] : 11/21 [BoneDensityDate] : 02/17 [BoneDensityComments] : follows with Dr. Galindo  [FreeTextEntry2] : nurse, works at

## 2022-07-21 ENCOUNTER — APPOINTMENT (OUTPATIENT)
Dept: ENDOCRINOLOGY | Facility: CLINIC | Age: 37
End: 2022-07-21

## 2022-12-06 ENCOUNTER — APPOINTMENT (OUTPATIENT)
Dept: ENDOCRINOLOGY | Facility: CLINIC | Age: 37
End: 2022-12-06
Payer: MEDICAID

## 2022-12-06 VITALS
DIASTOLIC BLOOD PRESSURE: 70 MMHG | SYSTOLIC BLOOD PRESSURE: 112 MMHG | HEIGHT: 62 IN | OXYGEN SATURATION: 99 % | HEART RATE: 79 BPM | BODY MASS INDEX: 27.79 KG/M2 | WEIGHT: 151 LBS

## 2022-12-06 DIAGNOSIS — R53.83 OTHER FATIGUE: ICD-10-CM

## 2022-12-06 DIAGNOSIS — L70.9 ACNE, UNSPECIFIED: ICD-10-CM

## 2022-12-06 LAB
24R-OH-CALCIDIOL SERPL-MCNC: 53.9 PG/ML
25(OH)D3 SERPL-MCNC: 28.2 NG/ML
ALBUMIN SERPL ELPH-MCNC: 5 G/DL
ALP BLD-CCNC: 68 U/L
ALT SERPL-CCNC: 16 U/L
ANION GAP SERPL CALC-SCNC: 16 MMOL/L
AST SERPL-CCNC: 26 U/L
BILIRUB SERPL-MCNC: <0.2 MG/DL
BUN SERPL-MCNC: 11 MG/DL
CALCIUM SERPL-MCNC: 9.9 MG/DL
CALCIUM SERPL-MCNC: 9.9 MG/DL
CHLORIDE SERPL-SCNC: 102 MMOL/L
CO2 SERPL-SCNC: 18 MMOL/L
CREAT SERPL-MCNC: 0.54 MG/DL
EGFR: 122 ML/MIN/1.73M2
GLUCOSE SERPL-MCNC: 89 MG/DL
PARATHYROID HORMONE INTACT: 25 PG/ML
POTASSIUM SERPL-SCNC: 4.6 MMOL/L
PROT SERPL-MCNC: 7.7 G/DL
SODIUM SERPL-SCNC: 136 MMOL/L
T4 FREE SERPL-MCNC: 1.2 NG/DL
TSH SERPL-ACNC: 1.74 UIU/ML

## 2022-12-06 PROCEDURE — 99215 OFFICE O/P EST HI 40 MIN: CPT

## 2022-12-06 RX ORDER — ERGOCALCIFEROL 1.25 MG/1
1.25 MG CAPSULE ORAL
Qty: 13 | Refills: 1 | Status: ACTIVE | COMMUNITY
Start: 2022-12-06

## 2022-12-06 RX ORDER — LIRAGLUTIDE 6 MG/ML
18 INJECTION, SOLUTION SUBCUTANEOUS DAILY
Qty: 1 | Refills: 3 | Status: DISCONTINUED | COMMUNITY
Start: 2022-12-06 | End: 2022-12-06

## 2022-12-06 NOTE — PHYSICAL EXAM
[Alert] : alert [Well Nourished] : well nourished [No Acute Distress] : no acute distress [Well Developed] : well developed [Normal Sclera/Conjunctiva] : normal sclera/conjunctiva [EOMI] : extra ocular movement intact [No Proptosis] : no proptosis [Normal Oropharynx] : the oropharynx was normal [No Respiratory Distress] : no respiratory distress [No Accessory Muscle Use] : no accessory muscle use [Clear to Auscultation] : lungs were clear to auscultation bilaterally [Normal S1, S2] : normal S1 and S2 [Normal Rate] : heart rate was normal [Regular Rhythm] : with a regular rhythm [No Edema] : no peripheral edema [Pedal Pulses Normal] : the pedal pulses are present [Normal Bowel Sounds] : normal bowel sounds [Not Tender] : non-tender [Not Distended] : not distended [Soft] : abdomen soft [Normal Anterior Cervical Nodes] : no anterior cervical lymphadenopathy [Normal Posterior Cervical Nodes] : no posterior cervical lymphadenopathy [No Spinal Tenderness] : no spinal tenderness [Spine Straight] : spine straight [No Stigmata of Cushings Syndrome] : no stigmata of Cushings Syndrome [Normal Gait] : normal gait [Normal Strength/Tone] : muscle strength and tone were normal [No Rash] : no rash [Normal Reflexes] : deep tendon reflexes were 2+ and symmetric [No Tremors] : no tremors [Oriented x3] : oriented to person, place, and time [Acanthosis Nigricans] : no acanthosis nigricans [de-identified] : enlarged thyroid on exam, painless, mobile with swallowing  [de-identified] : acne, facial

## 2022-12-06 NOTE — HISTORY OF PRESENT ILLNESS
[FreeTextEntry1] : Ms. DILLAN DANIEL is a 37 year old female\par coming for f/u primary hyperparathyroidism s/p parathyroid surgery 2017 with Dr Morillo\par she came back today as her mood swings and fatigue came back and she felt the same at the time her hyperparathyroidism was going on \par Also having cystic acne on her face seen dermatology received a cream therapy that did not work per patient\par Her sister diagnosed with thyroid disease asking to be seen by me\par Also she had COVID last year and since then she has been feeling very tired not able to go back and exercise gaining weight\par Used to be on Saxenda in the past and did very well asking to try again

## 2023-02-09 LAB
17OHP SERPL-MCNC: 20 NG/DL
ANDROST SERPL-MCNC: 104 NG/DL
DHEA-S SERPL-MCNC: 272 UG/DL
ESTRADIOL SERPL-MCNC: 49 PG/ML
FERRITIN SERPL-MCNC: 25 NG/ML
FOLATE SERPL-MCNC: 12.8 NG/ML
FSH SERPL-MCNC: 6 IU/L
IGF-1 INTERP: NORMAL
IGF-I BLD-MCNC: 212 NG/ML
IRON SATN MFR SERPL: 16 %
IRON SERPL-MCNC: 57 UG/DL
LH SERPL-ACNC: 5.3 IU/L
TESTOST SERPL-MCNC: 26.5 NG/DL
TIBC SERPL-MCNC: 346 UG/DL
UIBC SERPL-MCNC: 290 UG/DL
VIT B12 SERPL-MCNC: 1074 PG/ML

## 2023-03-24 ENCOUNTER — RESULT REVIEW (OUTPATIENT)
Age: 38
End: 2023-03-24

## 2023-03-27 ENCOUNTER — RESULT REVIEW (OUTPATIENT)
Age: 38
End: 2023-03-27

## 2023-03-27 ENCOUNTER — APPOINTMENT (OUTPATIENT)
Dept: ENDOCRINOLOGY | Facility: CLINIC | Age: 38
End: 2023-03-27
Payer: COMMERCIAL

## 2023-03-27 VITALS
HEART RATE: 80 BPM | BODY MASS INDEX: 26.13 KG/M2 | OXYGEN SATURATION: 98 % | DIASTOLIC BLOOD PRESSURE: 70 MMHG | WEIGHT: 142 LBS | HEIGHT: 62 IN | SYSTOLIC BLOOD PRESSURE: 112 MMHG

## 2023-03-27 DIAGNOSIS — M85.88 OTHER SPECIFIED DISORDERS OF BONE DENSITY AND STRUCTURE, OTHER SITE: ICD-10-CM

## 2023-03-27 DIAGNOSIS — E21.0 PRIMARY HYPERPARATHYROIDISM: ICD-10-CM

## 2023-03-27 DIAGNOSIS — E89.2 POSTPROCEDURAL HYPOPARATHYROIDISM: ICD-10-CM

## 2023-03-27 DIAGNOSIS — E66.3 OVERWEIGHT: ICD-10-CM

## 2023-03-27 DIAGNOSIS — E04.2 NONTOXIC MULTINODULAR GOITER: ICD-10-CM

## 2023-03-27 DIAGNOSIS — E55.9 VITAMIN D DEFICIENCY, UNSPECIFIED: ICD-10-CM

## 2023-03-27 LAB
25(OH)D3 SERPL-MCNC: 49.3 NG/ML
ANION GAP SERPL CALC-SCNC: 12 MMOL/L
BUN SERPL-MCNC: 9 MG/DL
CALCIUM SERPL-MCNC: 9.5 MG/DL
CALCIUM SERPL-MCNC: 9.5 MG/DL
CHLORIDE SERPL-SCNC: 103 MMOL/L
CO2 SERPL-SCNC: 26 MMOL/L
CREAT SERPL-MCNC: 0.65 MG/DL
EGFR: 116 ML/MIN/1.73M2
FOLATE SERPL-MCNC: 11.2 NG/ML
GLUCOSE SERPL-MCNC: 115 MG/DL
PARATHYROID HORMONE INTACT: 46 PG/ML
POTASSIUM SERPL-SCNC: 3.9 MMOL/L
SODIUM SERPL-SCNC: 141 MMOL/L
TSH SERPL-ACNC: 1.75 UIU/ML
VIT B12 SERPL-MCNC: 1081 PG/ML

## 2023-03-27 PROCEDURE — 99215 OFFICE O/P EST HI 40 MIN: CPT

## 2023-03-27 RX ORDER — PEN NEEDLE, DIABETIC 32GX 5/32"
32G X 4 MM NEEDLE, DISPOSABLE MISCELLANEOUS
Qty: 100 | Refills: 1 | Status: DISCONTINUED | COMMUNITY
Start: 2022-12-06 | End: 2023-03-27

## 2023-03-27 RX ORDER — SEMAGLUTIDE 1.34 MG/ML
2 INJECTION, SOLUTION SUBCUTANEOUS
Qty: 3 | Refills: 0 | Status: DISCONTINUED | COMMUNITY
Start: 2022-12-06 | End: 2023-03-27

## 2023-03-27 NOTE — HISTORY OF PRESENT ILLNESS
[FreeTextEntry1] : Ms. DILLAN DANIEL is a 37 year old female\par coming for f/u primary hyperparathyroidism s/p parathyroid surgery 2017 with Dr Morillo\par she came back today as she changed her insurance and Ozempic stopped being covered asking for samples as she lost 10lb on it in 3 months only at 5mg q week with just mild nausea transitory per pt \par Also having cystic acne on her face seen dermatology received a cream therapy that did not work per patient\par Her sister diagnosed with thyroid disease asking to be seen by me\par Also she had COVID last year and since then she has been feeling very tired not able to go back and exercise gaining weight\par Used to be on Saxenda in the past and did very well asking to try again

## 2023-03-27 NOTE — PHYSICAL EXAM
[Alert] : alert [Well Nourished] : well nourished [No Acute Distress] : no acute distress [Well Developed] : well developed [Normal Sclera/Conjunctiva] : normal sclera/conjunctiva [EOMI] : extra ocular movement intact [No Proptosis] : no proptosis [Normal Oropharynx] : the oropharynx was normal [No Respiratory Distress] : no respiratory distress [No Accessory Muscle Use] : no accessory muscle use [Clear to Auscultation] : lungs were clear to auscultation bilaterally [Normal S1, S2] : normal S1 and S2 [Normal Rate] : heart rate was normal [Regular Rhythm] : with a regular rhythm [No Edema] : no peripheral edema [Pedal Pulses Normal] : the pedal pulses are present [Normal Bowel Sounds] : normal bowel sounds [Not Tender] : non-tender [Not Distended] : not distended [Soft] : abdomen soft [Normal Anterior Cervical Nodes] : no anterior cervical lymphadenopathy [No Spinal Tenderness] : no spinal tenderness [Spine Straight] : spine straight [No Stigmata of Cushings Syndrome] : no stigmata of Cushings Syndrome [Normal Gait] : normal gait [Normal Strength/Tone] : muscle strength and tone were normal [No Rash] : no rash [Normal Reflexes] : deep tendon reflexes were 2+ and symmetric [No Tremors] : no tremors [Oriented x3] : oriented to person, place, and time [Acanthosis Nigricans] : no acanthosis nigricans [de-identified] : enlarged thyroid on exam, painless, mobile with swallowing  [de-identified] : acne, facial

## 2023-10-10 NOTE — ASU PREOP CHECKLIST - LAST TOOK
solids W Plasty Text: The lesion was extirpated to the level of the fat with a #15 scalpel blade. Given the location of the defect, shape of the defect and the proximity to free margins a W-plasty was deemed most appropriate for repair. Using a sterile surgical marker, the appropriate transposition arms of the W-plasty were drawn incorporating the defect and placing the expected incisions within the relaxed skin tension lines where possible. The area thus outlined was incised deep to adipose tissue with a #15 scalpel blade. The skin margins were undermined to an appropriate distance in all directions utilizing iris scissors. The opposing transposition arms were then transposed and carried over into place in opposite direction and anchored with interrupted buried subcutaneous sutures.

## 2023-11-03 ENCOUNTER — APPOINTMENT (OUTPATIENT)
Dept: FAMILY MEDICINE | Facility: CLINIC | Age: 38
End: 2023-11-03

## 2024-01-05 ENCOUNTER — APPOINTMENT (OUTPATIENT)
Dept: OBGYN | Facility: CLINIC | Age: 39
End: 2024-01-05